# Patient Record
Sex: MALE | Race: WHITE | NOT HISPANIC OR LATINO
[De-identification: names, ages, dates, MRNs, and addresses within clinical notes are randomized per-mention and may not be internally consistent; named-entity substitution may affect disease eponyms.]

---

## 2017-06-06 PROBLEM — Z00.00 ENCOUNTER FOR PREVENTIVE HEALTH EXAMINATION: Status: ACTIVE | Noted: 2017-06-06

## 2017-06-22 VITALS — HEIGHT: 67 IN | WEIGHT: 140 LBS | BODY MASS INDEX: 21.97 KG/M2

## 2017-06-22 PROBLEM — Z87.440 HISTORY OF ACUTE CYSTITIS: Status: RESOLVED | Noted: 2017-06-22 | Resolved: 2017-06-22

## 2017-06-22 PROBLEM — Z87.440 HISTORY OF BLADDER INFECTIONS: Status: RESOLVED | Noted: 2017-06-22 | Resolved: 2017-06-22

## 2017-06-22 PROBLEM — R33.9 URINE RETENTION: Status: ACTIVE | Noted: 2017-06-22

## 2017-06-22 PROBLEM — R35.0 FREQUENT URINATION: Status: ACTIVE | Noted: 2017-06-22

## 2017-06-22 PROBLEM — R39.89 URETHRAL PAIN: Status: RESOLVED | Noted: 2017-06-22 | Resolved: 2017-06-22

## 2017-06-22 PROBLEM — N41.9 PROSTATITIS: Status: ACTIVE | Noted: 2017-06-22

## 2017-06-23 ENCOUNTER — APPOINTMENT (OUTPATIENT)
Dept: UROLOGY | Facility: CLINIC | Age: 22
End: 2017-06-23

## 2017-06-23 DIAGNOSIS — Z87.440 PERSONAL HISTORY OF URINARY (TRACT) INFECTIONS: ICD-10-CM

## 2017-06-23 DIAGNOSIS — N41.9 INFLAMMATORY DISEASE OF PROSTATE, UNSPECIFIED: ICD-10-CM

## 2017-06-23 DIAGNOSIS — R33.9 RETENTION OF URINE, UNSPECIFIED: ICD-10-CM

## 2017-06-23 DIAGNOSIS — N42.81 PROSTATODYNIA SYNDROME: ICD-10-CM

## 2017-06-23 DIAGNOSIS — Z78.9 OTHER SPECIFIED HEALTH STATUS: ICD-10-CM

## 2017-06-23 DIAGNOSIS — I86.1 SCROTAL VARICES: ICD-10-CM

## 2017-06-23 DIAGNOSIS — R35.0 FREQUENCY OF MICTURITION: ICD-10-CM

## 2017-06-23 DIAGNOSIS — Z82.3 FAMILY HISTORY OF STROKE: ICD-10-CM

## 2017-06-23 DIAGNOSIS — R39.89 OTHER SYMPTOMS AND SIGNS INVOLVING THE GENITOURINARY SYSTEM: ICD-10-CM

## 2017-06-23 RX ORDER — DICLOFENAC SODIUM 75 MG
75 TABLET, DELAYED RELEASE (ENTERIC COATED) ORAL
Refills: 0 | Status: DISCONTINUED | COMMUNITY

## 2017-06-23 RX ORDER — SULFAMETHOXAZOLE AND TRIMETHOPRIM 800; 160 MG/1; MG/1
TABLET ORAL
Refills: 0 | Status: DISCONTINUED | COMMUNITY

## 2017-06-23 RX ORDER — OFLOXACIN OTIC 3 MG/ML
SOLUTION AURICULAR (OTIC)
Refills: 0 | Status: DISCONTINUED | COMMUNITY

## 2017-06-30 ENCOUNTER — TRANSCRIPTION ENCOUNTER (OUTPATIENT)
Age: 22
End: 2017-06-30

## 2017-08-07 ENCOUNTER — APPOINTMENT (OUTPATIENT)
Dept: UROLOGY | Facility: CLINIC | Age: 22
End: 2017-08-07

## 2019-06-29 ENCOUNTER — EMERGENCY (EMERGENCY)
Facility: HOSPITAL | Age: 24
LOS: 1 days | Discharge: HOME | End: 2019-06-29
Attending: EMERGENCY MEDICINE | Admitting: EMERGENCY MEDICINE
Payer: COMMERCIAL

## 2019-06-29 VITALS
SYSTOLIC BLOOD PRESSURE: 113 MMHG | TEMPERATURE: 98 F | WEIGHT: 139.99 LBS | HEART RATE: 51 BPM | OXYGEN SATURATION: 99 % | DIASTOLIC BLOOD PRESSURE: 65 MMHG | RESPIRATION RATE: 18 BRPM

## 2019-06-29 VITALS
OXYGEN SATURATION: 98 % | RESPIRATION RATE: 18 BRPM | SYSTOLIC BLOOD PRESSURE: 122 MMHG | DIASTOLIC BLOOD PRESSURE: 76 MMHG | TEMPERATURE: 97 F | HEART RATE: 68 BPM

## 2019-06-29 DIAGNOSIS — R10.31 RIGHT LOWER QUADRANT PAIN: ICD-10-CM

## 2019-06-29 DIAGNOSIS — R10.33 PERIUMBILICAL PAIN: ICD-10-CM

## 2019-06-29 DIAGNOSIS — Z88.5 ALLERGY STATUS TO NARCOTIC AGENT: ICD-10-CM

## 2019-06-29 DIAGNOSIS — Z88.1 ALLERGY STATUS TO OTHER ANTIBIOTIC AGENTS STATUS: ICD-10-CM

## 2019-06-29 DIAGNOSIS — R10.9 UNSPECIFIED ABDOMINAL PAIN: ICD-10-CM

## 2019-06-29 PROCEDURE — 83690 ASSAY OF LIPASE: CPT

## 2019-06-29 PROCEDURE — 99284 EMERGENCY DEPT VISIT MOD MDM: CPT

## 2019-06-29 PROCEDURE — 81003 URINALYSIS AUTO W/O SCOPE: CPT

## 2019-06-29 PROCEDURE — 80053 COMPREHEN METABOLIC PANEL: CPT

## 2019-06-29 PROCEDURE — 85027 COMPLETE CBC AUTOMATED: CPT

## 2019-06-29 PROCEDURE — 76705 ECHO EXAM OF ABDOMEN: CPT | Mod: 26

## 2019-06-29 PROCEDURE — 76705 ECHO EXAM OF ABDOMEN: CPT

## 2019-06-29 NOTE — ED ADULT NURSE REASSESSMENT NOTE - NS ED NURSE REASSESS COMMENT FT1
Patient a/oX 3, US and all tests resulted, no new abdominal pain or other symptom result.  Vital signs stable. Discharged to home I stable condition.

## 2019-06-29 NOTE — ED PROVIDER NOTE - PHYSICAL EXAMINATION
Constitutional: WDWN resting comfortably in bed; NAD  Head: NC/AT  Respiratory: CTA B/L  Cardiac: +S1/S2; RRR; no M/R/G  Gastrointestinal: soft, mild tenderness to deep palpation at umbilicus and RLQ; no rebound or guarding, negative hernández's sign, hypoactive bowel sounds throughout  : no hemorrhoids or blood on rectal exam, no stool in rectal vault  Extremities: WWP, no edema  Vascular: 2+ radial, femoral, DP/PT pulses B/L  Neurologic: AAOx3; CNII-XII grossly intact; no focal deficits

## 2019-06-29 NOTE — ED ADULT NURSE NOTE - OBJECTIVE STATEMENT
Patient c/o of RUQ abdominal pain, dull ache, worse on lying down, x 2 weeks, becoming worse, no nausea/vomitting/diarrhea, had regular BM yesterday.  States pain radiates to RLQ of abdomen and between shouder blades/back, and states had elevated Bilirubin levels on labs done w/ PMD 2 weeks ago and last week.

## 2019-06-29 NOTE — ED PROVIDER NOTE - PROVIDER TOKENS
PROVIDER:[TOKEN:[[id number]]] FREE:[LAST:[Lance],FIRST:[Hansa],PHONE:[(   )    -],FAX:[(   )    -]],PROVIDER:[TOKEN:[[id number]]]

## 2019-06-29 NOTE — ED PROVIDER NOTE - NSFOLLOWUPINSTRUCTIONS_ED_ALL_ED_FT
Abdominal Pain, Adult  Abdominal pain can be caused by many things. Often, abdominal pain is not serious and it gets better with no treatment or by being treated at home. However, sometimes abdominal pain is serious. Your health care provider will do a medical history and a physical exam to try to determine the cause of your abdominal pain.    Follow these instructions at home:  Take over-the-counter and prescription medicines only as told by your health care provider. Do not take a laxative unless told by your health care provider.  ImageDrink enough fluid to keep your urine clear or pale yellow.  Watch your condition for any changes.  Keep all follow-up visits as told by your health care provider. This is important.  Contact a health care provider if:  Your abdominal pain changes or gets worse.  You are not hungry or you lose weight without trying.  You are constipated or have diarrhea for more than 2–3 days.  You have pain when you urinate or have a bowel movement.  Your abdominal pain wakes you up at night.  Your pain gets worse with meals, after eating, or with certain foods.  You are throwing up and cannot keep anything down.  You have a fever.  Get help right away if:  Your pain does not go away as soon as your health care provider told you to expect.  You cannot stop throwing up.  Your pain is only in areas of the abdomen, such as the right side or the left lower portion of the abdomen.  You have bloody or black stools, or stools that look like tar.  You have severe pain, cramping, or bloating in your abdomen.  You have signs of dehydration, such as:  Dark urine, very little urine, or no urine.  Cracked lips.  Dry mouth.  Sunken eyes.  Sleepiness.  Weakness.  This information is not intended to replace advice given to you by your health care provider. Make sure you discuss any questions you have with your health care provider.

## 2019-06-29 NOTE — ED PROVIDER NOTE - CARE PROVIDERS DIRECT ADDRESSES
[email or web address] ,[email or web address],[email or web address]

## 2019-06-29 NOTE — ED PROVIDER NOTE - CLINICAL SUMMARY MEDICAL DECISION MAKING FREE TEXT BOX
Patient is a young male with EtOh use history presenting with non-distinct abdominal pain with some features of, but not entirely consistent with pancreatitis, cholecystitis or appendicitis. Nonacute abdomen. No distress. Labwork and imaging. Depending on results, surgical consult. Patient is a young male with EtOh use history presenting with non-distinct abdominal pain with some features of, but not entirely consistent with pancreatitis, cholecystitis or appendicitis. Nonacute abdomen. No distress. Labwork and RUQ US. Sonogram negative.

## 2019-06-29 NOTE — ED PROVIDER NOTE - CARE PROVIDER_API CALL
Flora Barrios ()  [place]  [address] [place] [address]  Phone: [phone]  Fax: [phone]  Follow Up Time: Flora Barrios ()  [place]  [address] [place] [address]  Phone: [phone]  Fax: [phone]  Follow Up Time: Hansa Lucas  Phone: (   )    -  Fax: (   )    -  Follow Up Time:     [place] ([place] [address]  Phone: [phone]  Fax: [phone]  Follow Up Time:

## 2019-06-29 NOTE — ED ADULT NURSE NOTE - CHPI ED NUR SYMPTOMS NEG
no chills/no dysuria/no fever/no burning urination/no blood in stool/no abdominal distension/no hematuria/no diarrhea/no vomiting/no nausea

## 2019-06-29 NOTE — ED PROVIDER NOTE - OBJECTIVE STATEMENT
Patient is a 24 year old male with pmhx of anxiety and EtOH use (5 beers/week for past year, more in college - graduated last year) who presents with abdominal pain starting last evening. Patient explains that after eating dinner last night (fatty; deep fried rice and vegetables) he noticed a mild-moderate dull, aching pain in his RUQ as he was going to sleep (no radiation, no acute onset; noticed when laying down to sleep). Denied cramping sensation. Says it felt better when he leaned forward, worse when he lay back flat. No emesis, heartburn. When he woke up in the AM, pain persisted, then with additional pain between the scapulae and in the RLQ. Has not stooled but denies recent constipation or diarrhea, hematochezia, sharp pains in LLQ. Denies decreased appetite, says he has an appetite and actively tried not to eat when he woke up an decided he would come to the hospital. Tried no medications. Denies pain in the umbilical region prior to RLQ pain onset or in the epigastrum radiating to the back. Has been drinking water, doing so did not make him nauseous, was able to tolerate. Denied fevers, chills, chest pain, shortness of breath, says he hasn't noticed whether he's passed gas. No peritoneal signs, negative hernández's sign, rovsing's sign. Some tenderness to deep palpation in the RLQ and at the umbilicus but inconsistent. Rectal exam unremarkable.    *Patient has been followed by primary care physician, Dr. Le who is trending a resolving transaminitis she identified two weeks ago during routine physical exam. Patient credits the improvement from most recent bloodwork one week ago with cessation of EtOh use for past two weeks. Patient is a 24 year old male with pmhx of anxiety and EtOH use (5 beers/week for past year, more in college - graduated last year) who presents with abdominal pain starting last evening. Patient explains that after eating dinner last night (fatty; deep fried rice and vegetables) he noticed a mild-moderate dull, aching pain in his RUQ as he was going to sleep (no radiation, no acute onset; first noticed when laying down). Denied cramping sensation. Says it felt better when he leaned forward, worse when he lay back flat. No emesis, heartburn. When he woke up in the AM, pain persisted, then with additional pain between the scapulae and in the RLQ. Has not stooled but denies recent constipation or diarrhea, hematochezia, sharp pains in LLQ. Denies decreased appetite, says he has an appetite and actively tried not to eat when he woke up an decided he would come to the hospital. Tried no medications. Denies pain in the umbilical region prior to RLQ pain onset or in the epigastrum radiating to the back. Has been drinking water, doing so did not make him nauseous, was able to tolerate. Denied fevers, chills, chest pain, shortness of breath, says he hasn't noticed whether he's passed gas. No peritoneal signs, negative hernández's sign, rovsing's sign. Some tenderness to deep palpation in the RLQ and at the umbilicus but inconsistent. Rectal exam unremarkable.    *Patient has been followed by primary care physician, Dr. Le who is trending a resolving transaminitis she identified two weeks ago during routine physical exam. Patient credits the improvement from most recent bloodwork one week ago with cessation of EtOh use for past two weeks.

## 2019-06-29 NOTE — ED PROVIDER NOTE - ATTENDING CONTRIBUTION TO CARE
Pt is a 22yo m, who p/w ruq pain starting last night, dull ache, occurred after eating fatty meal. No associated n/v/d/c/melena/bloody stool/f/c. No urinary sx's.    VITAL SIGNS: I have reviewed nursing notes and confirm.  CONSTITUTIONAL: Well-developed; well-nourished; in no acute distress.   SKIN:  warm and dry, no acute rash.   HEAD:  normocephalic, atraumatic.  EYES: EOM intact; conjunctiva and sclera clear.  ENT: No nasal discharge; airway clear.   NECK: Supple; non tender.  CARD: S1, S2 normal; no murmurs, gallops, or rubs. Regular rate and rhythm.   RESP:  Clear to auscultation b/l, no wheezes, rales or rhonchi.  ABD: Normal bowel sounds; soft; non-distended; minimal ruq tenderness; no guarding/ rebound; no Fermin's sign. No rlq tenderness.   BACK: No cvat.  EXT: Normal ROM. No clubbing, cyanosis or edema. 2+ pulses to b/l ue/le.  NEURO: Alert, oriented, grossly unremarkable  PSYCH: Cooperative, mood and affect appropriate.    Labs reviewed with findings of normal wbc, lipase. Tb mildly elev to 2.4 (improved from prior lab work 2 wks ago at which time tb was 3). Ast,alt wnl. UA neg for infection/ blood. RUQ sono neg for gallstones/ cholecystitis. Pt declined pain meds in ED. Evaluation and management discussed with the patient and family in detail.  Close PMD/ gi follow up encouraged.  Strict ED return instructions discussed in detail and patient given the opportunity to ask any questions about their discharge diagnosis and instructions. Patient verbalized understanding.

## 2019-07-01 ENCOUNTER — INBOUND DOCUMENT (OUTPATIENT)
Age: 24
End: 2019-07-01

## 2020-01-29 ENCOUNTER — TRANSCRIPTION ENCOUNTER (OUTPATIENT)
Age: 25
End: 2020-01-29